# Patient Record
Sex: MALE | Race: WHITE
[De-identification: names, ages, dates, MRNs, and addresses within clinical notes are randomized per-mention and may not be internally consistent; named-entity substitution may affect disease eponyms.]

---

## 2021-12-22 ENCOUNTER — HOSPITAL ENCOUNTER (OUTPATIENT)
Dept: HOSPITAL 47 - RADMRIMAIN | Age: 81
Discharge: HOME | End: 2021-12-22
Attending: INTERNAL MEDICINE
Payer: MEDICARE

## 2021-12-22 DIAGNOSIS — M79.605: Primary | ICD-10-CM

## 2021-12-22 PROCEDURE — 74185 MRA ABD W OR W/O CNTRST: CPT

## 2021-12-23 NOTE — MR
EXAMINATION TYPE: MR Angio Run off w/con

 

DATE OF EXAM: 12/22/2021

 

COMPARISON: None.

 

HISTORY: Bilateral lower leg pain for several months per patient.

 

CONTRAST: 

Standard multiplanar, multisequence MRI departmental protocol images were obtained without contrast a
nd with 15 mL intravenous Gadavist gadolinium contrast diluted with 25 cc of sterile saline. 2-D and 
3-D reconstructed images created on an independent workstation and reviewed. Exam is abdomen and pelv
is MRA imaging with bilateral lower extremity runoff.

 

FINDINGS: Patency of the celiac artery with tortuous course, cannot exclude celiac artery compression
 syndrome image 66 series 303. Correlate clinically. Patent SMA and TORREY. Patent large renal arteries 
along with is small caliber accessory right renal artery without significant stenosis. Some ectasia t
o the abdominal aorta without greater than 3.0 cm aneurysm. No significant stenosis in the common or 
external iliac arteries bilaterally. 

 

Patency to the femoral bifurcation without significant stenosis. No significant stenosis in the super
ficial femoral arteries bilaterally. Suboptimal evaluation at level of left knee due to metallic pros
thesis. Visualized portion of the of the popliteal arteries bilaterally show no significant stenosis.


 

Satisfactory bilateral bifurcation and trifurcation with good three-vessel flow in the bilateral mid 
legs and satisfactory symmetric two-vessel flow in the distal legs and ankles. No obvious significant
 stenosis. Some artifact limitation at level of right ankle medially due to susceptibility artifact.

 

 

 

IMPRESSION: Artifact limitation at the level of left knee and right ankle otherwise no significant fo
monika stenosis from the diaphragm to the hindfoot level identified.

## 2025-04-22 ENCOUNTER — HOSPITAL ENCOUNTER (OUTPATIENT)
Dept: HOSPITAL 47 - RADMRIMAIN | Age: 85
Discharge: HOME | End: 2025-04-22
Attending: UROLOGY
Payer: MEDICARE

## 2025-04-22 DIAGNOSIS — N40.0: Primary | ICD-10-CM

## 2025-04-22 DIAGNOSIS — R97.20: ICD-10-CM

## 2025-04-22 PROCEDURE — 72197 MRI PELVIS W/O & W/DYE: CPT
